# Patient Record
Sex: FEMALE | Race: WHITE | NOT HISPANIC OR LATINO | Employment: STUDENT | URBAN - METROPOLITAN AREA
[De-identification: names, ages, dates, MRNs, and addresses within clinical notes are randomized per-mention and may not be internally consistent; named-entity substitution may affect disease eponyms.]

---

## 2023-02-27 ENCOUNTER — APPOINTMENT (OUTPATIENT)
Dept: GENERAL RADIOLOGY | Facility: CLINIC | Age: 23
End: 2023-02-27
Attending: EMERGENCY MEDICINE
Payer: COMMERCIAL

## 2023-02-27 ENCOUNTER — HOSPITAL ENCOUNTER (EMERGENCY)
Facility: CLINIC | Age: 23
Discharge: HOME | End: 2023-02-27
Attending: EMERGENCY MEDICINE | Admitting: EMERGENCY MEDICINE
Payer: COMMERCIAL

## 2023-02-27 VITALS
BODY MASS INDEX: 18.94 KG/M2 | WEIGHT: 125 LBS | HEIGHT: 68 IN | DIASTOLIC BLOOD PRESSURE: 79 MMHG | OXYGEN SATURATION: 97 % | RESPIRATION RATE: 18 BRPM | HEART RATE: 92 BPM | SYSTOLIC BLOOD PRESSURE: 114 MMHG | TEMPERATURE: 98.5 F
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
## 2023-02-27 DIAGNOSIS — S70.02XA CONTUSION OF LEFT HIP, INITIAL ENCOUNTER: ICD-10-CM

## 2023-02-27 PROCEDURE — 73502 X-RAY EXAM HIP UNI 2-3 VIEWS: CPT | Mod: 26 | Performed by: RADIOLOGY

## 2023-02-27 PROCEDURE — 73502 X-RAY EXAM HIP UNI 2-3 VIEWS: CPT

## 2023-02-27 PROCEDURE — 99284 EMERGENCY DEPT VISIT MOD MDM: CPT | Performed by: EMERGENCY MEDICINE

## 2023-02-27 RX ORDER — IBUPROFEN 600 MG/1
600 TABLET, FILM COATED ORAL EVERY 6 HOURS PRN
Qty: 30 TABLET | Refills: 0 | Status: SHIPPED | OUTPATIENT
Start: 2023-02-27

## 2023-02-27 RX ORDER — HYDROCODONE BITARTRATE AND ACETAMINOPHEN 5; 325 MG/1; MG/1
1 TABLET ORAL EVERY 6 HOURS PRN
Qty: 18 TABLET | Refills: 0 | Status: SHIPPED | OUTPATIENT
Start: 2023-02-27 | End: 2023-03-02

## 2023-02-27 RX ORDER — BUPROPION HYDROCHLORIDE 300 MG/1
300 TABLET ORAL EVERY MORNING
COMMUNITY

## 2023-02-27 ASSESSMENT — ACTIVITIES OF DAILY LIVING (ADL): ADLS_ACUITY_SCORE: 35

## 2023-02-27 NOTE — DISCHARGE INSTRUCTIONS
TODAY'S VISIT:  You were seen today for hip pain after a fall  -   - If you had any labs or imaging/radiology tests performed today, you should also discuss these tests with your usual provider.     FOLLOW-UP:  Please make an appointment to follow up with:  - Your Primary Care Provider. If you do not have a PCP, please call the [facility] (phone: [phone] for an appointment  - Please make an appointment to follow up with Orthopedics Clinic (phone: [phone]) in 2 weeks    - Have your provider review the results from today's visit with you again to make sure no further follow-up or additional testing is needed based on those results.     PRESCRIPTIONS / MEDICATIONS:  - You may take Ibuprofen and/or Tylenol as needed for pain. You can take 600 mg of Ibuprofen every 6 hours and 1 g Tylenol every 6 hours. It may help to alternate these, so you take something every 3 hours.     OTHER INSTRUCTIONS:  -  Applying ice to the affected area several times a day over the next 1-2 days may also help with pain and swelling    RETURN TO THE EMERGENCY DEPARTMENT  Return to the Emergency Department at any time for any new or worsening symptoms or any concerns.

## 2023-02-27 NOTE — CONSULTS
[facility] Orthopaedic Surgery Consultation    Desiree Wolf MRN# [id number]   Age: 22 year old YOB: 2000   Date of Admission: 2/27/2023    Reason for consult: Left hip pain s/p fall   Requesting physician: Michelle Vargas MD   Level of consult: Consult, follow and place orders            Impression and Recommendation (Resident / Clinician):   Impression:  Desiree Wolf is a 22 year old female who presents with left hip pain s/p slip and fall on ice. X-rays do not demonstrate an obvious fracture, but also doesn't exclude it. She was able to bear weight while in the ED. Discussed with the patient that we can do a CT today, but our treatment would likely be the same. The patient will return if her pain increases or if she will be unable to bear weight and we can do a CT at that time.      Recommendations:  - weight bear as tolerated on crutches  - follow up with nonoperative orthopedic provider in 2 weeks with repeat AP pelvis and lateral left hip xrays  - ED to provide note for school for 1 month  - pain meds per ED   - can discharge from an orthopedic standpoint    Discussed assessment and plan with orthopedic surgery senior resident, Dr. Velázquez. Orthopedic surgery staff is Dr. King.    Thank you for allowing me to participate in this patient's care. Please do not hesitate to contact me with any questions or concerns.    Rachel Almodovar DO  PGY-1  Orthopaedic Surgery  Pager: [phone]     Please page me directly with any questions/concerns during regular weekday hours before 5 pm. If there is no response, if it is a weekend, or if it is during evening hours then please page the orthopedic surgery resident on call.           Chief Complaint:   Left hip pain           History of Present Illness (Resident / Clinician):   Patient experienced a slip and fall on ice earlier today where she fell primarily onto her left hip.  She was unable to bear weight after the incident. Reports her pain is  primarily laterally but radiates anteriorly and posteriorly.   She  denies any numbness, tingling, or radiation in the legs.  Denies antecedent left hip pain. Has not sustained an injury to the left hip in the past.            Past Medical History:     Past Medical History:   Diagnosis Date     Anxiety      Depressive disorder             Past Surgical History:   History reviewed. No pertinent surgical history.         Social History:     Social History     Socioeconomic History     Marital status: Single     Spouse name: Not on file     Number of children: Not on file     Years of education: Not on file     Highest education level: Not on file   Occupational History     Not on file   Tobacco Use     Smoking status: Never     Smokeless tobacco: Never   Substance and Sexual Activity     Alcohol use: Yes     Drug use: Yes     Types: Marijuana     Sexual activity: Not on file   Other Topics Concern     Not on file   Social History Narrative     Not on file     Social Determinants of Health     Financial Resource Strain: Not on file   Food Insecurity: Not on file   Transportation Needs: Not on file   Physical Activity: Not on file   Stress: Not on file   Social Connections: Not on file   Intimate Partner Violence: Not on file   Housing Stability: Not on file     Occupation: student          Family History:   History reviewed. No pertinent family history.               Allergies:   No Known Allergies          Medications:     Prior to Admission medications    Medication Sig Last Dose Taking? Auth Provider Long Term End Date   buPROPion (WELLBUTRIN XL) 300 MG 24 hr tablet Take 300 mg by mouth every morning  Yes Reported, Patient Yes    etonogestrel (NEXPLANON) 68 MG IMPL 1 each by Subdermal route once  Yes Reported, Patient Yes      Medication reviewed with patient and in chart.           Physical Exam:     Vitals:    02/27/23 1135   BP: (!) 150/54   Pulse: (!) 129   Resp: 14   Temp: 98.5  F (36.9  C)   TempSrc: Oral  "  SpO2: 98%   Weight: 56.7 kg (125 lb)   Height: 1.727 m (5' 8\")     General: Patient is awake, alert, and appropriate; following commands and is in NAD. Tearful   Lungs: Breaths nonlabored, without wheezes or stridor  Heart/Cardiovascular: Regular pulse, no peripheral cyanosis  Pelvis: Mild tenderness to left AP compression and lateral compression  Left lower Extremity: No deformity, skin intact. No echymosis. TTP over lateral aspect of left hip. Mild TTP also noted posterior hip. No significant tenderness to palpation over thigh, knee, leg, ankle/foot. No pain with ROM hip/knee/ankle. Motor intact distally TA/GSC/EHL/FHL with 5/5 strength. Unable to adequately test hip strength secondary to pain. SILT sp/dp/tibial/saph/sural nerves. DP pulses palpable, 2+, toes warm and well perfused           Imaging:   Review of imaging below demonstrates no obvious fractures or dislocations noted of left hip.     XR Hip Left 2-3 Views    Result Date: 2/27/2023  2 views pelvis/left hip radiographs 2/27/2023 12:35 PM History: fall, hip pain Comparison: None available. Findings: AP view of pelvis, crosstable lateral view of the left hip were obtained. Very mild contour irregularity appearance at the left pubic body and questionable lucency left superior pubic ramus. No substantial degenerative change of the hip.  Others: Prominent bowel gas and fecal content obscures sacrum, innominate bones and lumbar spine.     Impression: Very mild contour irregularity appearance at the left pubic body and questionable lucency left superior pubic ramus. In current clinical setting, non-displaced fracture cannot be entirely excluded. RECESS.AHASHI   SYSTEM ID:  [id number]           Labs:   CBC:  No results found for: WBC, HGB, PLT    BMP:  No results found for: NA, POTASSIUM, CHLORIDE, CO2, BUN, CR, ANIONGAP, ELMER, GLC    Inflammatory Markers:  No results found for: WBC, CRP, SED         "

## 2023-02-27 NOTE — LETTER
February 27, 2023      To Whom It May Concern:      Desiree Wolf was seen in our Emergency Department today, 02/27/23.  She may have difficulty navigating campus for the next month due to an injury, so accomodations that can be made to allow for extra time to arrive at class or utilize remote learning may be necessary.     Sincerely,        Michelle Vargas MD

## 2023-02-27 NOTE — ED TRIAGE NOTES
PTA slipped on steps landing on left hip. Unable to bear weight. Denies numbness or tingling in that foot.     Triage Assessment     Row Name 02/27/23 1137       Triage Assessment (Adult)    Airway WDL WDL

## 2023-02-27 NOTE — ED PROVIDER NOTES
History     Chief Complaint   Patient presents with     Fall     Right hip pain     HPI  Desiree Wolf is a 22 year old female with a past medical history of anxiety/depression who presents to the emergency department with a chief complaint of fall. She states that just prior to arrival in the emergency department she slipped and fell on the steps leaving her apartment building.  She landed on her lateral left hip.  She has been unable to bear weight on it since the fall.  No associated numbness/tingling.  She is not on any blood thinners.  No head or neck trauma or pain.  No other associated injuries.  No arm pain or right leg pain.  No pain in left knee and ankle.    I have reviewed the Medications, Allergies, Past Medical and Surgical History, and Social History in the [employer] system.    Past Medical History:   Diagnosis Date     Anxiety      Depressive disorder      History reviewed. No pertinent surgical history.  No current facility-administered medications for this encounter.     Current Outpatient Medications   Medication     buPROPion (WELLBUTRIN XL) 300 MG 24 hr tablet     etonogestrel (NEXPLANON) 68 MG IMPL     No Known Allergies  Past medical history, past surgical history, medications, and allergies were reviewed with the patient. Additional pertinent items: None    Social History     Socioeconomic History     Marital status: Single     Spouse name: Not on file     Number of children: Not on file     Years of education: Not on file     Highest education level: Not on file   Occupational History     Not on file   Tobacco Use     Smoking status: Never     Smokeless tobacco: Never   Substance and Sexual Activity     Alcohol use: Yes     Drug use: Yes     Types: Marijuana     Sexual activity: Not on file   Other Topics Concern     Not on file   Social History Narrative     Not on file     Social Determinants of Health     Financial Resource Strain: Not on file   Food Insecurity: Not on file  "  Transportation Needs: Not on file   Physical Activity: Not on file   Stress: Not on file   Social Connections: Not on file   Intimate Partner Violence: Not on file   Housing Stability: Not on file     Social history was reviewed with the patient. Additional pertinent items: None    Review of Systems  A medically appropriate review of systems was performed with pertinent positives and negatives noted in the HPI, and all other systems negative.    Physical Exam   BP: (!) 150/54  Pulse: (!) 129  Temp: 98.5  F (36.9  C)  Resp: 14  Height: 172.7 cm (5' 8\")  Weight: 56.7 kg (125 lb)  SpO2: 98 %      General: Well nourished, well developed, NAD  HEENT: EOMI, anicteric. NCAT, MMM  Neck: no jugular venous distension, supple, nl ROM  Cardiac: Tachycardic rate, intact peripheral pulses.    Pulm: Airway patent, nonlabored breathing, normal respiratory rate  Skin: Warm and dry to the touch.  No rash  Extremities: No LE edema, no cyanosis, w/w/p.  Pain with range of motion of the left hip, distally neurovascularly intact, no deformity.  Tenderness overlying the lateral hip area.  Pain is worse with active range of motion rather than passive range of motion.  Neuro: A&Ox3, no gross focal deficits    ED Course        Procedures                        Labs Ordered and Resulted from Time of ED Arrival to Time of ED Departure - No data to display         Results for orders placed or performed during the hospital encounter of 02/27/23 (from the past 24 hour(s))   XR Hip Left 2-3 Views    Narrative    2 views pelvis/left hip radiographs 2/27/2023 12:35 PM    History: fall, hip pain    Comparison: None available.    Findings:    AP view of pelvis, crosstable lateral view of the left hip were  obtained.     Very mild contour irregularity appearance at the left pubic body and  questionable lucency left superior pubic ramus.    No substantial degenerative change of the hip.     Others:    Prominent bowel gas and fecal content obscures " sacrum, innominate  bones and lumbar spine.      Impression    Impression: Very mild contour irregularity appearance at the left  pubic body and questionable lucency left superior pubic ramus. In  current clinical setting, non-displaced fracture cannot be entirely  excluded.    RANDY BLOOM         SYSTEM ID:  [id number]       Labs, vital signs, and imaging studies were reviewed by me.    Medications - No data to display    Assessments & Plan (with Medical Decision Making)   Desiree Wolf is a 22 year old female who presents with left hip pain after a fall.  Differential diagnosis includes contusion, sprain, fracture of pelvis or femur.  Patient states she took ibuprofen prior to coming into the emergency department and declines pain medications in the ER today.  X-ray was ordered to further evaluate the patient.    X-ray shows a very mild contour irregularity at the left pubic body and questionable lucency at the left superior pubic ramus, cannot exclude nondisplaced fracture.    1254 Pt d/w orthopedic surgery, they will come evaluate the patient in the ER    Patient was discussed with orthopedic surgery, they have seen the patient in the emergency department and recommend discharging her home with crutches and medication for pain control.  She should follow-up in clinic with orthopedic surgery in 2 weeks.    Medical Decision Making  The patient presented with a problem that is low complexity (an acute and uncomplicated illness or injury).    The patient's evaluation involved:  ordering and/or review of 1 test(s) in this encounter (see separate area of note for details)  independent interpretation of testing performed by another health professional (XR)  discussion of management or test interpretation with another health professional (see separate area of note for details)    The patient's management involved moderate risk (prescription drug management including medications given in the ED).    I have reviewed  the nursing notes.    I have reviewed the findings, diagnosis, plan and need for follow up with the patient.    Patient to be discharged home. Advised to follow up with orthopedic surgery as directed. To return to ER immediately with any new/worsening symptoms. Plan of care discussed with patient who expresses understanding and agrees with plan of care.    Discharge Medication List as of 2/27/2023  2:30 PM      START taking these medications    Details   HYDROcodone-acetaminophen (NORCO) 5-325 MG tablet Take 1 tablet by mouth every 6 hours as needed for pain, Disp-18 tablet, R-0, E-Prescribe      ibuprofen (ADVIL/MOTRIN) 600 MG tablet Take 1 tablet (600 mg) by mouth every 6 hours as needed, Disp-30 tablet, R-0, E-Prescribe             Final diagnoses:   Contusion of left hip, initial encounter   Fall from slipping on ice, initial encounter       Michelle Vargas MD  2/27/2023   [facility] EMERGENCY DEPARTMENT     Michelle Vargas MD  02/27/23 2048

## 2023-02-28 ENCOUNTER — TELEPHONE (OUTPATIENT)
Dept: ORTHOPEDICS | Facility: CLINIC | Age: 23
End: 2023-02-28
Payer: COMMERCIAL

## 2023-03-03 NOTE — TELEPHONE ENCOUNTER
DIAGNOSIS: Left hip injury    APPOINTMENT DATE: 3.14.23   NOTES STATUS DETAILS   DISCHARGE REPORT from the ER Internal 2.27.23 Michelle Vargas MD--fv   MEDICATION LIST Internal    XRAYS (IMAGES & REPORTS) Internal 2.27.23 L hip

## 2023-03-14 ENCOUNTER — PRE VISIT (OUTPATIENT)
Dept: ORTHOPEDICS | Facility: CLINIC | Age: 23
End: 2023-03-14

## 2023-05-21 ENCOUNTER — HEALTH MAINTENANCE LETTER (OUTPATIENT)
Age: 23
End: 2023-05-21

## 2024-07-28 ENCOUNTER — HEALTH MAINTENANCE LETTER (OUTPATIENT)
Age: 24
End: 2024-07-28

## 2025-08-10 ENCOUNTER — HEALTH MAINTENANCE LETTER (OUTPATIENT)
Age: 25
End: 2025-08-10